# Patient Record
Sex: MALE | HISPANIC OR LATINO
[De-identification: names, ages, dates, MRNs, and addresses within clinical notes are randomized per-mention and may not be internally consistent; named-entity substitution may affect disease eponyms.]

---

## 2022-08-29 ENCOUNTER — APPOINTMENT (OUTPATIENT)
Dept: ENDOCRINOLOGY | Facility: CLINIC | Age: 37
End: 2022-08-29

## 2022-08-29 VITALS
TEMPERATURE: 66 F | DIASTOLIC BLOOD PRESSURE: 80 MMHG | HEIGHT: 71 IN | SYSTOLIC BLOOD PRESSURE: 115 MMHG | BODY MASS INDEX: 29.96 KG/M2 | WEIGHT: 214 LBS

## 2022-08-29 PROBLEM — Z00.00 ENCOUNTER FOR PREVENTIVE HEALTH EXAMINATION: Status: ACTIVE | Noted: 2022-08-29

## 2022-08-29 PROCEDURE — 99205 OFFICE O/P NEW HI 60 MIN: CPT

## 2022-08-29 NOTE — HISTORY OF PRESENT ILLNESS
[FreeTextEntry1] : 37 y/o M w/ reported hx of hypothyroidism\par here for initial evaluation and management\par generally feels well and endorses no acute complaints.\par reports cc of non specific symptoms of tensional HA, tiredness, weight gain. these have been present over the last 2 years. he reports he saw PCP who reviewed symptoms and informed him his thyroid was off. he was started on LT4 25 mcg, which he has been compliant w/. symptoms persisted so his dose was increased to 50 mcg in 5/2022. he states he had ED visit ~ 1 month ago after waking up w/ palpitations and OMA. states he had exercised the night before and taken unspecified "performance enhancing supplement" bought at [facility], he does not recall name, but states it contained caffeine. He was evaluated at the ED and discharged w/o intervention, was informed he was dehydrated. he otherwise denies any f/c, CP, SOB, palpitations, tremors, depressed mood, anxiety, palpitations, n/v, stool/urinary abn, skin/weight changes, heat/cold intolerance, HAs, breast/nipple changes, polyuria/polydipsia/nocturia or other complaints. denies any dysphagia, hoarseness, neck tenderness or new palpable masses. denies any family history of thyroid disorders or personal exposure to ionizing radiation.\par \par \par \par

## 2022-08-29 NOTE — ASSESSMENT
[FreeTextEntry1] : appears clinically hyperthyroid on low dose LT4, recent symptoms after dose increase suggest hypothyroidism is not present. symptoms could possibly be due to use of unspecified supplement. advised to halt the use of said supplement immediately and to bring all meds/ supplements on the [place] for review. we will confirm hypothyroid status w/ labs and antibodies. for now advised to hold LT4 pending repeat labs in 4 weeks. reviewed proper diet and exercise w/ calotic counting for weight management. no meds for weight loss at this time. Verbalized understanding and agrees with treatment plan, will contact MD and seek emergency medical care if condition changes.\par

## 2022-10-31 ENCOUNTER — APPOINTMENT (OUTPATIENT)
Dept: ENDOCRINOLOGY | Facility: CLINIC | Age: 37
End: 2022-10-31

## 2022-10-31 DIAGNOSIS — E66.3 OVERWEIGHT: ICD-10-CM

## 2022-10-31 DIAGNOSIS — F55.8 ABUSE OF OTHER NON-PSYCHOACTIVE SUBSTANCES: ICD-10-CM

## 2022-10-31 DIAGNOSIS — E03.9 HYPOTHYROIDISM, UNSPECIFIED: ICD-10-CM

## 2022-10-31 DIAGNOSIS — E55.9 VITAMIN D DEFICIENCY, UNSPECIFIED: ICD-10-CM

## 2022-10-31 PROCEDURE — 99215 OFFICE O/P EST HI 40 MIN: CPT

## 2022-10-31 RX ORDER — UBIDECARENONE/VIT E ACET 100MG-5
25 MCG CAPSULE ORAL
Qty: 90 | Refills: 1 | Status: ACTIVE | COMMUNITY
Start: 2022-10-31 | End: 1900-01-01

## 2022-10-31 RX ORDER — LEVOTHYROXINE SODIUM 0.05 MG/1
50 TABLET ORAL
Qty: 90 | Refills: 2 | Status: ACTIVE | COMMUNITY
Start: 2022-08-22 | End: 1900-01-01

## 2022-10-31 NOTE — HISTORY OF PRESENT ILLNESS
[FreeTextEntry1] : 37 y/o M w/ reported hx of hypothyroidism\par  initial evaluation and management\par generally feels well and endorses no acute complaints.\par reports cc of non specific symptoms of tensional HA, tiredness, weight gain. these have been present over the last 2 years. he reports he saw PCP who reviewed symptoms and informed him his thyroid was off. he was started on LT4 25 mcg, which he has been compliant w/. symptoms persisted so his dose was increased to 50 mcg in 5/2022. he states he had ED visit ~ 1 month ago after waking up w/ palpitations and OMA. states he had exercised the night before and taken unspecified "performance enhancing supplement" bought at [facility], he does not recall name, but states it contained caffeine. He was evaluated at the ED and discharged w/o intervention, was informed he was dehydrated. \par \par 10/2022 Here for /fu, generally feels well and endorses no acute complaints. No interval events since LV. Today reports ongoing palpitations and dizziness when standing. did not stop caffeine/creatine supplement. labs from 10/2022 show TSH at 5, fT4 at 1.1, TPO at 500, neg TSH recep ab/TSI. CMP still w/ signs of dehydration w/ Na at 147\par he otherwise denies any f/c, CP, SOB, palpitations, tremors, depressed mood, anxiety, palpitations, n/v, stool/urinary abn, skin/weight changes, heat/cold intolerance, HAs, breast/nipple changes, polyuria/polydipsia/nocturia or other complaints. denies any dysphagia, hoarseness, neck tenderness or new palpable masses. denies any family history of thyroid disorders or personal exposure to ionizing radiation.\par \par \par \par

## 2024-12-09 ENCOUNTER — APPOINTMENT (OUTPATIENT)
Dept: ENDOCRINOLOGY | Facility: CLINIC | Age: 39
End: 2024-12-09
Payer: COMMERCIAL

## 2024-12-09 VITALS — WEIGHT: 209 LBS | HEIGHT: 71 IN | BODY MASS INDEX: 29.26 KG/M2

## 2024-12-09 DIAGNOSIS — E66.3 OVERWEIGHT: ICD-10-CM

## 2024-12-09 DIAGNOSIS — R73.03 PREDIABETES.: ICD-10-CM

## 2024-12-09 DIAGNOSIS — E03.9 HYPOTHYROIDISM, UNSPECIFIED: ICD-10-CM

## 2024-12-09 DIAGNOSIS — E78.5 HYPERLIPIDEMIA, UNSPECIFIED: ICD-10-CM

## 2024-12-09 PROCEDURE — G2211 COMPLEX E/M VISIT ADD ON: CPT

## 2024-12-09 PROCEDURE — 99215 OFFICE O/P EST HI 40 MIN: CPT

## 2025-05-12 ENCOUNTER — APPOINTMENT (OUTPATIENT)
Dept: ENDOCRINOLOGY | Facility: CLINIC | Age: 40
End: 2025-05-12
Payer: COMMERCIAL

## 2025-05-12 VITALS
HEIGHT: 71 IN | BODY MASS INDEX: 28.7 KG/M2 | SYSTOLIC BLOOD PRESSURE: 100 MMHG | WEIGHT: 205 LBS | DIASTOLIC BLOOD PRESSURE: 70 MMHG

## 2025-05-12 DIAGNOSIS — E66.3 OVERWEIGHT: ICD-10-CM

## 2025-05-12 DIAGNOSIS — R73.03 PREDIABETES.: ICD-10-CM

## 2025-05-12 DIAGNOSIS — E55.9 VITAMIN D DEFICIENCY, UNSPECIFIED: ICD-10-CM

## 2025-05-12 DIAGNOSIS — E03.9 HYPOTHYROIDISM, UNSPECIFIED: ICD-10-CM

## 2025-05-12 DIAGNOSIS — E78.5 HYPERLIPIDEMIA, UNSPECIFIED: ICD-10-CM

## 2025-05-12 PROCEDURE — 99215 OFFICE O/P EST HI 40 MIN: CPT
